# Patient Record
Sex: FEMALE | Race: WHITE | NOT HISPANIC OR LATINO | URBAN - METROPOLITAN AREA
[De-identification: names, ages, dates, MRNs, and addresses within clinical notes are randomized per-mention and may not be internally consistent; named-entity substitution may affect disease eponyms.]

---

## 2021-09-07 ENCOUNTER — EMERGENCY (EMERGENCY)
Facility: HOSPITAL | Age: 56
LOS: 1 days | Discharge: HOME | End: 2021-09-07
Admitting: EMERGENCY MEDICINE
Payer: SELF-PAY

## 2021-09-07 VITALS
TEMPERATURE: 97 F | RESPIRATION RATE: 18 BRPM | HEART RATE: 53 BPM | SYSTOLIC BLOOD PRESSURE: 109 MMHG | HEIGHT: 67 IN | WEIGHT: 145.06 LBS | DIASTOLIC BLOOD PRESSURE: 72 MMHG | OXYGEN SATURATION: 98 %
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
## 2021-09-07 DIAGNOSIS — S01.81XA LACERATION WITHOUT FOREIGN BODY OF OTHER PART OF HEAD, INITIAL ENCOUNTER: ICD-10-CM

## 2021-09-07 DIAGNOSIS — S00.31XA ABRASION OF NOSE, INITIAL ENCOUNTER: ICD-10-CM
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
## 2021-09-07 DIAGNOSIS — Z23 ENCOUNTER FOR IMMUNIZATION: ICD-10-CM

## 2021-09-07 DIAGNOSIS — S01.111A LACERATION WITHOUT FOREIGN BODY OF RIGHT EYELID AND PERIOCULAR AREA, INITIAL ENCOUNTER: ICD-10-CM

## 2021-09-07 DIAGNOSIS — S09.90XA UNSPECIFIED INJURY OF HEAD, INITIAL ENCOUNTER: ICD-10-CM

## 2021-09-07 DIAGNOSIS — S02.2XXA FRACTURE OF NASAL BONES, INITIAL ENCOUNTER FOR CLOSED FRACTURE: ICD-10-CM

## 2021-09-07 PROCEDURE — 70486 CT MAXILLOFACIAL W/O DYE: CPT | Mod: 26

## 2021-09-07 PROCEDURE — 99285 EMERGENCY DEPT VISIT HI MDM: CPT

## 2021-09-07 PROCEDURE — 70450 CT HEAD/BRAIN W/O DYE: CPT | Mod: 26

## 2021-09-07 RX ORDER — ONDANSETRON 8 MG/1
4 TABLET, FILM COATED ORAL ONCE
Refills: 0 | Status: COMPLETED | OUTPATIENT
Start: 2021-09-07 | End: 2021-09-07

## 2021-09-07 RX ORDER — TETANUS TOXOID, REDUCED DIPHTHERIA TOXOID AND ACELLULAR PERTUSSIS VACCINE, ADSORBED 5; 2.5; 8; 8; 2.5 [IU]/.5ML; [IU]/.5ML; UG/.5ML; UG/.5ML; UG/.5ML
0.5 SUSPENSION INTRAMUSCULAR ONCE
Refills: 0 | Status: COMPLETED | OUTPATIENT
Start: 2021-09-07 | End: 2021-09-07

## 2021-09-07 RX ADMIN — TETANUS TOXOID, REDUCED DIPHTHERIA TOXOID AND ACELLULAR PERTUSSIS VACCINE, ADSORBED 0.5 MILLILITER(S): 5; 2.5; 8; 8; 2.5 SUSPENSION INTRAMUSCULAR at 13:49

## 2021-09-07 RX ADMIN — ONDANSETRON 4 MILLIGRAM(S): 8 TABLET, FILM COATED ORAL at 13:49

## 2021-09-07 NOTE — ED PROVIDER NOTE - CARE PLAN
Principal Discharge DX:	Laceration of face   1 Principal Discharge DX:	Laceration of face  Secondary Diagnosis:	Nasal bone fracture

## 2021-09-07 NOTE — ED PROVIDER NOTE - PATIENT PORTAL LINK FT
You can access the FollowMyHealth Patient Portal offered by [facility] by registering at the following website: [email or web address]. By joining [employer]’s FollowMyHealth portal, you will also be able to view your health information using other applications (apps) compatible with our system.
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
## 2021-09-07 NOTE — ED PROVIDER NOTE - CARE PROVIDER_API CALL
Shaka Zavala)  Plastic Surgery  [address], [address]  [place] [address]  Phone: [phone]  Fax: [phone]  Follow Up Time:

## 2021-09-07 NOTE — ED PROVIDER NOTE - HIV OFFER
Labs orders in to scheudle with her physcial   Or sooner urgent if needed   
Please advise message.   
Opt out

## 2021-09-07 NOTE — ED PROVIDER NOTE - PHYSICAL EXAMINATION
2cm linear laceration over R forehead, no active bleeding. supeficial abrasion over R side of nose, 1cm superficial laceration over R eyelid
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
## 2021-09-07 NOTE — ED ADULT NURSE NOTE - NSIMPLEMENTINTERV_GEN_ALL_ED
Implemented All Universal Safety Interventions:  [place] to call system. Call bell, personal items and telephone within reach. Instruct patient to call for assistance. Room bathroom lighting operational. Non-slip footwear when patient is off stretcher. Physically safe environment: no spills, clutter or unnecessary equipment. Stretcher in lowest position, wheels locked, appropriate side rails in place.

## 2021-09-07 NOTE — ED PROVIDER NOTE - NSFOLLOWUPINSTRUCTIONS_ED_ALL_ED_FT
Fracture    A fracture is a break in one of your bones. This can occur from a variety of injuries, especially traumatic ones. Symptoms include pain, bruising, or swelling. Do not use the injured limb. If a fracture is in one of the bones below your waist, do not put weight on that limb unless instructed to do so by your healthcare provider. Crutches or a cane may have been provided. A splint or cast may have been applied by your health care provider. Make sure to keep it dry and follow up with an orthopedist as instructed.    SEEK IMMEDIATE MEDICAL CARE IF YOU HAVE ANY OF THE FOLLOWING SYMPTOMS: numbness, tingling, increasing pain, or weakness in any part of the injured limb.  What is a nose fracture?  A "fracture" is another word for a broken bone. A nose fracture is when a person breaks a bone in the nose (figure 1).    There are different kinds of nose fractures, depending on how the bone breaks. Some fractures are more serious than others. If the bone is only cracked, the fracture is usually not as serious. But if a bone is pushed out of position, the fracture is more serious.    What are the symptoms of a nose fracture?  Symptoms of a nose fracture include:    ?Pain    ?Swelling    ?Trouble breathing through the nose    ?Bleeding from the nose or clear fluid draining from the nose    ?Being unable to smell    A nose fracture can also make the nose look crooked or bent.    What if my nose is bleeding?  If your nose is bleeding, you can follow these steps to help stop the bleeding:    ?Bend forward a little at the waist when you sit or stand. Do NOT lie down or tilt your head back.    ?Firmly pinch together the soft area towards the bottom of your nose, below the bone, using your thumb and finger (picture 1).    ?Hold a cold gel pack, bag of ice, or bag of frozen vegetables on your nose for a few minutes at a time. Try to put a thin towel between the ice (or other cold object) and your skin.    If the bleeding continues, you can gently put some gauze or a cotton ball in your nose. But if you can't get the bleeding to stop, see a doctor or nurse.    Will I need tests?  Some people need tests. Your doctor or nurse will ask about your injury and symptoms, and do an exam. Depending on your injury and symptoms, he or she might do an X-ray or CT scan. A CT scan is an imaging test that creates pictures of the inside of the body.    How are nose fractures treated? – For the first 1 to 2 days after your injury, you can help reduce swelling by:    ?Keeping your head above the level of your heart (and not lying down flat)    ?Putting ice on your nose – You can put a cold gel pack, bag of ice, or bag of frozen vegetables on the injured area every 1 to 2 hours, for 15 minutes each time. You should put a thin towel between the ice (or other cold object) and your skin. You should use the ice (or other cold object) for at least 6 hours after your injury. Some people find it helpful to ice longer, even up to 2 days after their injury.    If you have a lot of pain or a severe fracture, your doctor will prescribe a strong pain medicine. If you have a mild fracture, he or she will recommend that you take an over-the-counter medicine for your pain. Over-the-counter medicines include acetaminophen (sample brand name: Tylenol), ibuprofen (sample brand names: Advil, Motrin), and naproxen (sample brand name: Aleve).    Other treatment depends on how mild or severe your nose fracture is. If your nose bones are in the correct position and you can breathe through both nostrils, your nose fracture is mild. You probably won't need other treatment. But your doctor might want to check your nose again after the swelling has gotten better.    If you have a collection of blood in your nose, the doctor will drain the blood. Then he or she will put "packing material" in your nose for a few days to stop the bleeding.    If your injury pushed your nose bones out of position, the bones will need to be put back in the correct position. Doctors can sometimes put the nose bones back in the correct position by doing a procedure.    But if this procedure doesn't work, or you have a severe nose fracture, you will need surgery. Nose surgery is usually done by a specialist called an ear, nose, and throat doctor.    How long do nose fractures take to heal?  Most nose fractures take weeks to heal. The healing time depends on how mild or severe the fracture is.    Healing time also depends on the person. Healthy children usually heal much more quickly than older adults or adults with other medical problems.    Can I do anything to improve the healing process?  Yes. It's important to follow all of your doctor's instructions while your nose fracture is healing. For example, he or she might recommend that you avoid doing certain activities.    When should I call my doctor or nurse?  After treatment, your doctor or nurse will tell you when to call him or her. In general, you should call him or her if:    ?Your pain or swelling gets worse.    ?Your nose is bleeding a lot, or clear fluid is leaking from your nose.    ?You are having trouble breathing.    Take afrin nasal spray for the next 4 days for congestion.